# Patient Record
Sex: FEMALE | URBAN - METROPOLITAN AREA
[De-identification: names, ages, dates, MRNs, and addresses within clinical notes are randomized per-mention and may not be internally consistent; named-entity substitution may affect disease eponyms.]

---

## 2018-05-03 ENCOUNTER — APPOINTMENT (RX ONLY)
Dept: URBAN - METROPOLITAN AREA CLINIC 15 | Facility: CLINIC | Age: 38
Setting detail: DERMATOLOGY
End: 2018-05-03

## 2018-05-03 DIAGNOSIS — Z41.9 ENCOUNTER FOR PROCEDURE FOR PURPOSES OTHER THAN REMEDYING HEALTH STATE, UNSPECIFIED: ICD-10-CM

## 2018-05-03 DIAGNOSIS — L90.8 OTHER ATROPHIC DISORDERS OF SKIN: ICD-10-CM

## 2018-05-03 PROBLEM — E78.5 HYPERLIPIDEMIA, UNSPECIFIED: Status: ACTIVE | Noted: 2018-05-03

## 2018-05-03 PROBLEM — L70.0 ACNE VULGARIS: Status: ACTIVE | Noted: 2018-05-03

## 2018-05-03 PROCEDURE — ? FACIAL

## 2018-05-03 PROCEDURE — ? MEDICAL CONSULTATION: SKIN TIGHTENING

## 2018-05-03 PROCEDURE — ? MEDICAL CONSULTATION: COOLSCULPTING

## 2018-05-03 ASSESSMENT — LOCATION ZONE DERM: LOCATION ZONE: FACE

## 2018-05-03 ASSESSMENT — LOCATION DETAILED DESCRIPTION DERM: LOCATION DETAILED: LEFT INFERIOR MEDIAL MALAR CHEEK

## 2018-05-03 ASSESSMENT — LOCATION SIMPLE DESCRIPTION DERM: LOCATION SIMPLE: LEFT CHEEK

## 2018-05-03 NOTE — PROCEDURE: FACIAL
Detail Level: Zone
Treatment Type (Optional): Deep Cleanse Treatment
Treatment Type Override: deep pore cleansing facial
Mask Type (Optional): gel based
Exfoliation Type: scrub
Exfoliation Type Override: exfoliator
Facial Steaming: steamed
Extraction Method: extractor

## 2018-05-03 NOTE — HPI: COSMETIC (COOLSCULPTING)
Have You Had Previous Coolsculpting Treatments Before?: has not had previous treatments
Additional History: The patient would like to treat the medial thighs with Coolsculpting.

## 2018-08-09 ENCOUNTER — APPOINTMENT (RX ONLY)
Dept: URBAN - METROPOLITAN AREA CLINIC 15 | Facility: CLINIC | Age: 38
Setting detail: DERMATOLOGY
End: 2018-08-09

## 2018-08-09 DIAGNOSIS — Z41.9 ENCOUNTER FOR PROCEDURE FOR PURPOSES OTHER THAN REMEDYING HEALTH STATE, UNSPECIFIED: ICD-10-CM

## 2018-08-09 PROCEDURE — ? ADDITIONAL NOTES

## 2018-08-09 PROCEDURE — ? COOLSCULPTING

## 2018-08-09 ASSESSMENT — LOCATION DETAILED DESCRIPTION DERM: LOCATION DETAILED: EPIGASTRIC SKIN

## 2018-08-09 ASSESSMENT — LOCATION SIMPLE DESCRIPTION DERM: LOCATION SIMPLE: ABDOMEN

## 2018-08-09 ASSESSMENT — LOCATION ZONE DERM: LOCATION ZONE: TRUNK

## 2018-08-09 NOTE — HPI: COSMETIC (COOLSCULPTING)
Have You Had Previous Coolsculpting Treatments Before?: has not had previous treatments
Additional History: The patient would like to do the Coolsculpting procedure on her medial knees and above her knees.

## 2018-08-09 NOTE — PROCEDURE: COOLSCULPTING
Applicators: CoolAdvantage Core
Indication: non-invasive fat removal
Detail Level: Zone
Post Treatment: After treatment, the suction was turned off, and the applicator was removed from the skin. \\nA massage of 2 minutes of duration was performed area immediately after removing the applicators. \\nTreatment was well tolerated without complications. \\n\\nPatient reported 0 pain by the time of leaving. The area had mild erythema; it was numbed and had no bruising, blanching or signs of epidermal damage. \\nI gave home instructions with our phone and email contacts and I suggested drinking a lot of water, avoiding anti-inflammatories for 2 weeks (except Tylenol), Vitamin D3 3,000 UI, Vitamin C 1,000 mg and Zinc 50 mg once a day for 3 months and follow up in 1 month.
Post-Care Instructions: I reviewed with the patient in detail post-care instructions. Patient should stay away from the sun and wear sun protection until treated areas are fully healed.
Suction Settings: The suction settings were per protocol.
Intro: Patient was here for CoolSculpting treatment on the xxx area. \\nPatient signed the consent form, photo release form and had the opportunity to ask questions. Baseline pictures were taken. \\nWeight: xxx lb. \\n\\nPrior to treatment, the area was cleaned with alcohol (pretreatment skin wipes) and marked out with a marking pen. The gel sheet was then applied uniformly. The applicator was applied to the skin with good contact and suction. \\n\\nxxx area was treated with CoolAdvantageâ¢ 6.4 x 2 applicators.
Consent: Written consent obtained, risks reviewed including but not limited to blistering from suction, darker or lighter pigmentary change, bruising, and/or need for multiple treatments.
Time (Minutes): [address]
Device: [employer]
Price (Use Numbers Only, No Special Characters Or $): 0
Location: upper abdomen